# Patient Record
Sex: MALE | URBAN - METROPOLITAN AREA
[De-identification: names, ages, dates, MRNs, and addresses within clinical notes are randomized per-mention and may not be internally consistent; named-entity substitution may affect disease eponyms.]

---

## 2022-11-11 ENCOUNTER — TELEPHONE (OUTPATIENT)
Dept: TRANSPLANT | Facility: CLINIC | Age: 57
End: 2022-11-11

## 2022-11-11 NOTE — TELEPHONE ENCOUNTER
Contacted patient to review initial intake information. Patient reports the followin. Can you walk up a flight of stairs without getting short of breath or stopping? No   2. Can you walk one block without getting short of breath or having to stop? No   3. Do you use oxygen? No   4. Do you use a cane, walker, or wheel chair to assist in mobility? No   5. Have you been hospitalized or had recent surgery in the last 6 months? No    A. Stroke   B. Heart surgery or heart catheterization   C. Broken bone  6. Do you have any cuts, open sores (ulcers), or wounds anywhere on your body? Yes [facility]  If yes, where and for how long? Bottom of foot (few weeks)  7. Do you go to dialysis? No   8. Preferred appointment day? Anyday  9. Caregiver? Mother (Diamante) or Sister (Luz Elena)    Patient is aware the next steps will include completing records and compliance verification. Patient is aware once provider review and insurance authorization is received we will contact patient to schedule initial visit. Patient is aware that initial visit will begin prior to / at 7 am and will conclude at approximately 3 pm on date of appointment. All questions answered at this time.

## 2022-11-11 NOTE — LETTER
November 11, 2022    Uche Caba  [address] [facility] [address]  Phone: [phone]  Fax: [phone]    Dear Dr. Uche Caba:    Patient: Osbaldo Haider  MR Number [id number]  Date of Birth 1965    Thank you for your referral of Osbaldo Haider to our transplant program.      Your patient's information will be screened by our Referral Nurse Coordinators to determine initial medical candidacy and if any further records are required. We will contact you if further information is needed to process the referral.     Once all needed information is received it will be forwarded to our Transplant Financial Coordinators who will obtain insurance authorization. Upon insurance approval, your patient will be contacted by our  to schedule their appointments with the transplant team. When appointments are made you will receive a copy of the appointment letter that your patient will receive.     This process may take two to six weeks to complete.     In the event your patient is not a candidate a copy of our transplant programs opinion including reasons will be returned to you to comply with your yearly review regulations. A copy of that letter will also be sent to the patient.     The following information is needed to process a referral:  Medicare form 2728 for all patients on dialysis.  Dental clearance is required if your patient has primary Medicaid.  Current immunization record.  This information can be faxed to [phone].  Current Dietician evaluation can be faxed to [phone].    Thank you again for your trust in our program and the care of your patient.  If there is anything we can do for you or your staff, please feel free to contact us at [phone].    Sincerely,   [facility]  [facility] Kidney/Pancreas Transplant Team  [facility] [place] [address]  [phone]

## 2022-11-15 ENCOUNTER — TELEPHONE (OUTPATIENT)
Dept: TRANSPLANT | Facility: CLINIC | Age: 57
End: 2022-11-15
Payer: MEDICARE

## 2022-11-18 DIAGNOSIS — Z76.82 ORGAN TRANSPLANT CANDIDATE: Primary | ICD-10-CM

## 2022-11-21 ENCOUNTER — TELEPHONE (OUTPATIENT)
Dept: TRANSPLANT | Facility: CLINIC | Age: 57
End: 2022-11-21
Payer: MEDICARE

## 2022-12-07 ENCOUNTER — TELEPHONE (OUTPATIENT)
Dept: TRANSPLANT | Facility: CLINIC | Age: 57
End: 2022-12-07
Payer: MEDICARE